# Patient Record
(demographics unavailable — no encounter records)

---

## 2017-03-22 NOTE — RO
DATE OF PROCEDURE:  03/22/2017

 

PREPROCEDURE DIAGNOSIS:  Menometrorrhagia.

 

POSTPROCEDURE DIAGNOSIS:  Menometrorrhagia.

 

PROCEDURE:

1.  Dilation and curettage (D and C).

2.  Hysteroscopy.

3.  NovaSure endometrial ablation.

 

SURGEON:  Dr. Lei Cano.

 

ASSISTANT:

 

ANESTHESIA: General.

 

ESTIMATED BLOOD LOSS:  Less than 10 mL.

 

COMPLICATIONS: None.

 

FINDINGS: Normal-appearing cavity.  The uterus was sound to approximately 8 cm

giving a total cavity length of 5.5.  The cavity width was assessed at 4.4.

After the cervix was serially dilated, the hysteroscope was inserted and normal

appearing endometrial cavity was noted with fluffy endometrium.  At this point, a

sharp curettage of the endometrial lining was done and the tissues were sent to

pathology for final diagnosis.  The NovaSure endometrial device was inserted.

The cavity length adjusted to 5.5.  The cavity width to 4.4.  After passing a

cavity test, the device was enabled and the endometrial ablation cycle was then

started.  The cycle lasted approximately 56 seconds.  Good ablative process

noted.  The patient tolerated the procedure well.  She was then transferred to

recovery room in stable condition.